# Patient Record
Sex: MALE | Race: WHITE
[De-identification: names, ages, dates, MRNs, and addresses within clinical notes are randomized per-mention and may not be internally consistent; named-entity substitution may affect disease eponyms.]

---

## 2018-10-16 ENCOUNTER — HOSPITAL ENCOUNTER (EMERGENCY)
Dept: HOSPITAL 14 - H.ER | Age: 28
Discharge: HOME | End: 2018-10-16
Payer: COMMERCIAL

## 2018-10-16 VITALS
RESPIRATION RATE: 16 BRPM | TEMPERATURE: 98.3 F | OXYGEN SATURATION: 100 % | HEART RATE: 88 BPM | SYSTOLIC BLOOD PRESSURE: 113 MMHG | DIASTOLIC BLOOD PRESSURE: 79 MMHG

## 2018-10-16 VITALS — BODY MASS INDEX: 28.2 KG/M2
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
## 2018-10-16 DIAGNOSIS — S49.92XA: Primary | ICD-10-CM
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
## 2018-10-16 NOTE — RAD
Date of service: 



10/16/2018



PROCEDURE:  Radiographs of the Left Shoulder



HISTORY:

trauma







COMPARISON:

Left shoulder radiographs 10/25/2016.



FINDINGS:



BONES:

No acute fracture or destructive bony lesion identified.  A small 

solitary orthopedic anchor is identified at the left humeral head in 

the interval however. 



JOINTS:

No dislocation or subluxation identified.  Glenohumeral and 

acromioclavicular joints preserved. No osteoarthritis.



SOFT TISSUES:

Normal.



OTHER FINDINGS:

None. 



IMPRESSION:

No acute fracture or destructive bony lesion.  No dislocation or 

subluxation identified.  A solitary interval orthopedic anchor is 

identified at the left humeral head.